# Patient Record
Sex: FEMALE | Race: WHITE | NOT HISPANIC OR LATINO | Employment: FULL TIME
[De-identification: names, ages, dates, MRNs, and addresses within clinical notes are randomized per-mention and may not be internally consistent; named-entity substitution may affect disease eponyms.]

---

## 2017-12-31 ENCOUNTER — HEALTH MAINTENANCE LETTER (OUTPATIENT)
Age: 27
End: 2017-12-31

## 2018-03-05 ENCOUNTER — DOCUMENTATION ONLY (OUTPATIENT)
Dept: FAMILY MEDICINE | Facility: OTHER | Age: 28
End: 2018-03-05

## 2018-03-29 ENCOUNTER — APPOINTMENT (OUTPATIENT)
Dept: OCCUPATIONAL MEDICINE | Facility: OTHER | Age: 28
End: 2018-03-29

## 2018-03-29 ENCOUNTER — APPOINTMENT (OUTPATIENT)
Dept: CHIROPRACTIC MEDICINE | Facility: OTHER | Age: 28
End: 2018-03-29

## 2018-03-29 PROCEDURE — 99499 UNLISTED E&M SERVICE: CPT

## 2019-05-13 ENCOUNTER — OFFICE VISIT (OUTPATIENT)
Dept: FAMILY MEDICINE | Facility: OTHER | Age: 29
End: 2019-05-13
Attending: NURSE PRACTITIONER
Payer: COMMERCIAL

## 2019-05-13 VITALS
RESPIRATION RATE: 12 BRPM | SYSTOLIC BLOOD PRESSURE: 118 MMHG | HEIGHT: 64 IN | BODY MASS INDEX: 23.9 KG/M2 | HEART RATE: 76 BPM | WEIGHT: 140 LBS | DIASTOLIC BLOOD PRESSURE: 68 MMHG | TEMPERATURE: 97.9 F | OXYGEN SATURATION: 98 %

## 2019-05-13 DIAGNOSIS — Z30.011 ENCOUNTER FOR INITIAL PRESCRIPTION OF CONTRACEPTIVE PILLS: Primary | ICD-10-CM

## 2019-05-13 PROCEDURE — 99213 OFFICE O/P EST LOW 20 MIN: CPT | Performed by: NURSE PRACTITIONER

## 2019-05-13 RX ORDER — NORGESTIMATE AND ETHINYL ESTRADIOL 7DAYSX3 28
1 KIT ORAL DAILY
COMMUNITY
End: 2019-05-13

## 2019-05-13 RX ORDER — NORGESTIMATE AND ETHINYL ESTRADIOL 7DAYSX3 28
1 KIT ORAL DAILY
Qty: 30 TABLET | Refills: 11 | Status: SHIPPED | OUTPATIENT
Start: 2019-05-13 | End: 2020-04-14

## 2019-05-13 ASSESSMENT — MIFFLIN-ST. JEOR: SCORE: 1355.04

## 2019-05-13 ASSESSMENT — PAIN SCALES - GENERAL: PAINLEVEL: NO PAIN (0)

## 2019-05-13 NOTE — PATIENT INSTRUCTIONS
ICD-10-CM    1. Encounter for initial prescription of contraceptive pills Z30.011 norgestim-eth estrad triphasic (TRI-VYLIBRA) 0.18/0.215/0.25 MG-35 MCG tablet

## 2019-05-13 NOTE — PROGRESS NOTES
SUBJECTIVE:   Eryn Padilla is a 28 year old female who presents to clinic today for the following   health issues:        Medication Followup of birth control    Taking Medication as prescribed: yes    Side Effects:  None    Medication Helping Symptoms:  not applicable    Patient had been getting from [facility]. Her insurance now indicates she needs to get it from a pharmacy. Last pap 2 years ago    She had her Pap done 2-3 years ago with [facility], is not sure if HPV was done, she is in her 20's - per guidelines it would not have been. She will check with them and schedule pap if needed.         Additional history: as documented    Reviewed  and updated as needed this visit by clinical staff  Tobacco  Allergies  Meds  Med Hx  Surg Hx  Fam Hx  Soc Hx        Reviewed and updated as needed this visit by Provider         Patient Active Problem List   Diagnosis     ACP (advance care planning)     Past Surgical History:   Procedure Laterality Date     HERNIA REPAIR      fx left wrist     ORTHOPEDIC SURGERY      right wrist surgery at age 8-9       Social History     Tobacco Use     Smoking status: Current Every Day Smoker     Packs/day: 1.00     Years: 6.00     Pack years: 6.00     Types: Cigarettes     Smokeless tobacco: Never Used   Substance Use Topics     Alcohol use: Yes     Comment: occ     Family History   Problem Relation Age of Onset     Psychotic Disorder Brother          Current Outpatient Medications   Medication Sig Dispense Refill     norgestim-eth estrad triphasic (TRI-VYLIBRA) 0.18/0.215/0.25 MG-35 MCG tablet Take 1 tablet by mouth daily       No Known Allergies  No lab results found.   BP Readings from Last 3 Encounters:   05/13/19 118/68   06/03/16 116/70   05/10/16 118/80    Wt Readings from Last 3 Encounters:   05/13/19 63.5 kg (140 lb)   06/03/16 56.2 kg (124 lb)   05/10/16 56.8 kg (125 lb 3.2 oz)               ROS:  Constitutional, HEENT, cardiovascular, pulmonary, gi  "and gu systems are negative, except as otherwise noted.    OBJECTIVE:     /68 (BP Location: Left arm, Patient Position: Sitting, Cuff Size: Adult Regular)   Pulse 76   Temp 97.9  F (36.6  C) (Tympanic)   Resp 12   Ht 1.626 m (5' 4\")   Wt 63.5 kg (140 lb)   SpO2 98%   BMI 24.03 kg/m    Body mass index is 24.03 kg/m .  GENERAL: healthy, alert and no distress  MS: no gross musculoskeletal defects noted, no edema  PSYCH: mentation appears normal, affect normal/bright      ASSESSMENT/PLAN:         ICD-10-CM    1. Encounter for initial prescription of contraceptive pills Z30.011 norgestim-eth estrad triphasic (TRI-VYLIBRA) 0.18/0.215/0.25 MG-35 MCG tablet       FUTURE APPOINTMENTS:       - Follow-up visit in 1 year - or for pap if needed per [facility].       Vicki Good NP  [facility] [place] [facility]      "

## 2019-05-13 NOTE — NURSING NOTE
"Chief Complaint   Patient presents with     Contraception       Initial /68 (BP Location: Left arm, Patient Position: Sitting, Cuff Size: Adult Regular)   Pulse 76   Temp 97.9  F (36.6  C) (Tympanic)   Resp 12   Ht 1.626 m (5' 4\")   Wt 63.5 kg (140 lb)   SpO2 98%   BMI 24.03 kg/m   Estimated body mass index is 24.03 kg/m  as calculated from the following:    Height as of this encounter: 1.626 m (5' 4\").    Weight as of this encounter: 63.5 kg (140 lb).  Medication Reconciliation: complete    Delia Gomez LPN    "

## 2020-04-14 DIAGNOSIS — Z30.011 ENCOUNTER FOR INITIAL PRESCRIPTION OF CONTRACEPTIVE PILLS: ICD-10-CM

## 2020-04-14 RX ORDER — NORGESTIMATE AND ETHINYL ESTRADIOL 7DAYSX3 28
KIT ORAL
Qty: 28 TABLET | Refills: 0 | Status: SHIPPED | OUTPATIENT
Start: 2020-04-14 | End: 2020-05-12

## 2020-04-14 NOTE — TELEPHONE ENCOUNTER
TRI-SPRINTEC 0.18/0.215/0.25 MG-35 MCG tablet      Last Written Prescription Date:  5.13.19  Last Fill Quantity: 30,   # refills: 11  Last Office Visit: 5.13.19  Future Office visit:

## 2020-05-12 DIAGNOSIS — Z30.011 ENCOUNTER FOR INITIAL PRESCRIPTION OF CONTRACEPTIVE PILLS: ICD-10-CM

## 2020-05-12 NOTE — LETTER
May 13, 2020      Eryn Padilla  [address]  [place] [address]        Dear Eryn,     Due to current COVID-19 concerns, we are temporarily refilling essential medications without an office visit and/or laboratory testing.  Please understand that certain medications require monitoring, so please schedule an appointment and/or laboratory work when the current restrictions are lifted.  Virtual Visits are available by telephone, video, or MyChart should you have any concerns (uncontrolled symptoms, medication side effects, etc).         Sincerely,    Vicki Good, NP

## 2020-05-12 NOTE — TELEPHONE ENCOUNTER
Tri-sprintec  Last Written Prescription Date: 4/14/20  Last Fill Quantity: 28 # of Refills: 0  Last Office Visit: 5/13/19

## 2020-05-13 RX ORDER — NORGESTIMATE AND ETHINYL ESTRADIOL 7DAYSX3 28
1 KIT ORAL DAILY
Qty: 28 TABLET | Refills: 1 | Status: SHIPPED | OUTPATIENT
Start: 2020-05-13 | End: 2020-07-09

## 2020-05-13 NOTE — TELEPHONE ENCOUNTER
Protocol failed due to    Recent (12 mo) or future (30 days) visit within the authorizing provider's specialty     LOV 5/13/19. Letter sent. Please advise. Thank you!

## 2021-06-22 ENCOUNTER — TELEPHONE (OUTPATIENT)
Dept: FAMILY MEDICINE | Facility: OTHER | Age: 31
End: 2021-06-22

## 2021-11-28 ENCOUNTER — HEALTH MAINTENANCE LETTER (OUTPATIENT)
Age: 31
End: 2021-11-28

## 2022-09-11 ENCOUNTER — HEALTH MAINTENANCE LETTER (OUTPATIENT)
Age: 32
End: 2022-09-11

## 2023-01-03 ENCOUNTER — ALLIED HEALTH/NURSE VISIT (OUTPATIENT)
Dept: PEDIATRICS | Facility: OTHER | Age: 33
End: 2023-01-03

## 2023-01-03 VITALS — HEART RATE: 102 BPM | DIASTOLIC BLOOD PRESSURE: 90 MMHG | SYSTOLIC BLOOD PRESSURE: 132 MMHG

## 2023-01-03 DIAGNOSIS — Z01.30 BLOOD PRESSURE CHECK: Primary | ICD-10-CM

## 2023-01-03 NOTE — PROGRESS NOTES
/90 automatic BP machine  Pulse-102    Dr. Storm present during BP check today and is aware of readings.  Advised patient to call her provider/ OB to report readings.  Patient states she has a BP machine at home and is going to start monitoring it at home and that she has a follow-up appointment on Friday with her provider.

## 2023-01-22 ENCOUNTER — HEALTH MAINTENANCE LETTER (OUTPATIENT)
Age: 33
End: 2023-01-22

## 2023-01-31 ENCOUNTER — MEDICAL CORRESPONDENCE (OUTPATIENT)
Dept: HEALTH INFORMATION MANAGEMENT | Facility: HOSPITAL | Age: 33
End: 2023-01-31

## 2023-02-08 ENCOUNTER — TELEPHONE (OUTPATIENT)
Dept: FAMILY MEDICINE | Facility: OTHER | Age: 33
End: 2023-02-08

## 2023-03-02 ENCOUNTER — MEDICAL CORRESPONDENCE (OUTPATIENT)
Dept: HEALTH INFORMATION MANAGEMENT | Facility: HOSPITAL | Age: 33
End: 2023-03-02

## 2023-05-09 ENCOUNTER — MEDICAL CORRESPONDENCE (OUTPATIENT)
Dept: HEALTH INFORMATION MANAGEMENT | Facility: HOSPITAL | Age: 33
End: 2023-05-09

## 2023-07-12 ENCOUNTER — MEDICAL CORRESPONDENCE (OUTPATIENT)
Dept: HEALTH INFORMATION MANAGEMENT | Facility: HOSPITAL | Age: 33
End: 2023-07-12

## 2023-11-03 ENCOUNTER — TRANSFERRED RECORDS (OUTPATIENT)
Dept: HEALTH INFORMATION MANAGEMENT | Facility: HOSPITAL | Age: 33
End: 2023-11-03

## 2023-11-03 LAB
HEP C HIM: NORMAL
HIV 1&2 EXT: NORMAL

## 2024-02-24 ENCOUNTER — HEALTH MAINTENANCE LETTER (OUTPATIENT)
Age: 34
End: 2024-02-24

## 2024-07-18 ENCOUNTER — MEDICAL CORRESPONDENCE (OUTPATIENT)
Dept: HEALTH INFORMATION MANAGEMENT | Facility: HOSPITAL | Age: 34
End: 2024-07-18

## 2024-08-06 ENCOUNTER — OFFICE VISIT (OUTPATIENT)
Dept: FAMILY MEDICINE | Facility: OTHER | Age: 34
End: 2024-08-06
Attending: STUDENT IN AN ORGANIZED HEALTH CARE EDUCATION/TRAINING PROGRAM
Payer: COMMERCIAL

## 2024-08-06 VITALS
WEIGHT: 163 LBS | HEIGHT: 64 IN | DIASTOLIC BLOOD PRESSURE: 68 MMHG | TEMPERATURE: 97.7 F | SYSTOLIC BLOOD PRESSURE: 108 MMHG | RESPIRATION RATE: 16 BRPM | OXYGEN SATURATION: 98 % | HEART RATE: 76 BPM | BODY MASS INDEX: 27.83 KG/M2

## 2024-08-06 DIAGNOSIS — L98.9 FOOT LESION: ICD-10-CM

## 2024-08-06 DIAGNOSIS — Z76.89 ENCOUNTER TO ESTABLISH CARE: Primary | ICD-10-CM

## 2024-08-06 DIAGNOSIS — L30.9 ECZEMA, UNSPECIFIED TYPE: ICD-10-CM

## 2024-08-06 DIAGNOSIS — L98.9 SKIN LESION: ICD-10-CM

## 2024-08-06 PROBLEM — O03.4 INCOMPLETE ABORTION: Status: RESOLVED | Noted: 2022-01-14 | Resolved: 2024-08-06

## 2024-08-06 PROBLEM — O03.4 INCOMPLETE ABORTION: Status: ACTIVE | Noted: 2022-01-14

## 2024-08-06 PROCEDURE — G2211 COMPLEX E/M VISIT ADD ON: HCPCS | Performed by: STUDENT IN AN ORGANIZED HEALTH CARE EDUCATION/TRAINING PROGRAM

## 2024-08-06 PROCEDURE — 99203 OFFICE O/P NEW LOW 30 MIN: CPT | Performed by: STUDENT IN AN ORGANIZED HEALTH CARE EDUCATION/TRAINING PROGRAM

## 2024-08-06 RX ORDER — HYDROCORTISONE 2.5 %
CREAM (GRAM) TOPICAL 2 TIMES DAILY
Qty: 30 G | Refills: 0 | Status: SHIPPED | OUTPATIENT
Start: 2024-08-06

## 2024-08-06 ASSESSMENT — ANXIETY QUESTIONNAIRES
GAD7 TOTAL SCORE: 0
7. FEELING AFRAID AS IF SOMETHING AWFUL MIGHT HAPPEN: NOT AT ALL
GAD7 TOTAL SCORE: 0
5. BEING SO RESTLESS THAT IT IS HARD TO SIT STILL: NOT AT ALL
6. BECOMING EASILY ANNOYED OR IRRITABLE: NOT AT ALL
8. IF YOU CHECKED OFF ANY PROBLEMS, HOW DIFFICULT HAVE THESE MADE IT FOR YOU TO DO YOUR WORK, TAKE CARE OF THINGS AT HOME, OR GET ALONG WITH OTHER PEOPLE?: NOT DIFFICULT AT ALL
GAD7 TOTAL SCORE: 0
1. FEELING NERVOUS, ANXIOUS, OR ON EDGE: NOT AT ALL
3. WORRYING TOO MUCH ABOUT DIFFERENT THINGS: NOT AT ALL
2. NOT BEING ABLE TO STOP OR CONTROL WORRYING: NOT AT ALL
IF YOU CHECKED OFF ANY PROBLEMS ON THIS QUESTIONNAIRE, HOW DIFFICULT HAVE THESE PROBLEMS MADE IT FOR YOU TO DO YOUR WORK, TAKE CARE OF THINGS AT HOME, OR GET ALONG WITH OTHER PEOPLE: NOT DIFFICULT AT ALL
7. FEELING AFRAID AS IF SOMETHING AWFUL MIGHT HAPPEN: NOT AT ALL
4. TROUBLE RELAXING: NOT AT ALL

## 2024-08-06 ASSESSMENT — PATIENT HEALTH QUESTIONNAIRE - PHQ9
10. IF YOU CHECKED OFF ANY PROBLEMS, HOW DIFFICULT HAVE THESE PROBLEMS MADE IT FOR YOU TO DO YOUR WORK, TAKE CARE OF THINGS AT HOME, OR GET ALONG WITH OTHER PEOPLE: NOT DIFFICULT AT ALL
SUM OF ALL RESPONSES TO PHQ QUESTIONS 1-9: 0
SUM OF ALL RESPONSES TO PHQ QUESTIONS 1-9: 0

## 2024-08-06 ASSESSMENT — PAIN SCALES - GENERAL: PAINLEVEL: NO PAIN (0)

## 2024-08-06 NOTE — PROGRESS NOTES
Assessment & Plan     Encounter to establish care  Medical, surgical, family, and social histories discussed updated. Reviewed active healthcare problems. Medications reviewed.     Eczema, unspecified type  Eczema present in the outer ear and along the pinna.  Recommend trial of hydrocortisone cream, twice daily, up to 14 days but can stop if lesions improved.  Should continue with emollient use twice daily after treatment with a hydrocortisone cream for prevention.  If symptoms are not improving at 2 weeks, recommend reaching out via MyChart to reassess.  May need to return for office visit.  - hydrocortisone 2.5 % cream; Apply topically 2 times daily    Foot lesion  Suspect this was also an eczematous type lesion but it has improved without treatment.  Continue to monitor.    Skin lesion  Suspect inflammatory acne.  Recommend washing with hydrogen peroxide over the lesion and warm compresses to bring to ahead to facilitate drainage.  Lower likelihood but could also be a sebaceous cyst, although much more superficial and atypical appearing.  If unable to facilitate skin drainage and healing with hydrogen peroxide and compresses, recommend follow-up to reassess.        Return in about 1 year (around 8/6/2025) for Physical.    The longitudinal plan of care for the diagnosis(es)/condition(s) as documented were addressed during this visit. Due to the added complexity in care, I will continue to support Eryn in the subsequent management and with ongoing continuity of care.    Subjective   Eryn is a 33 year old, presenting for the following health issues:  Establish Care      8/6/2024     2:26 PM   Additional Questions   Roomed by April   Accompanied by baby         8/6/2024     2:26 PM   Patient Reported Additional Medications   Patient reports taking the following new medications none     History of Present Illness       Reason for visit:  Eat & toes  Symptom onset:  More than a month  Symptoms include:  Dry  "flakes/patches  Symptom intensity:  Moderate  Symptom progression:  Worsening  Had these symptoms before:  Yes  Has tried/received treatment for these symptoms:  No  What makes it worse:  No  What makes it better:  No    She eats 0-1 servings of fruits and vegetables daily.She consumes 1 sweetened beverage(s) daily.She exercises with enough effort to increase her heart rate 9 or less minutes per day.  She exercises with enough effort to increase her heart rate 3 or less days per week.   She is taking medications regularly.     Establish care      Derm issue  Onset/Duration: 4 months  Description  Location: left ear, right foot  Character: flakey  Itching: no  Intensity:  mild  Progression of Symptoms:  same  Accompanying signs and symptoms:   Fever: No  Body aches or joint pain: No  Sore throat symptoms: No  Recent cold symptoms: No  History:           Previous episodes of similar rash: None  New exposures:  None  Recent travel: No  Exposure to similar rash: No  Precipitating or alleviating factors: none  Therapies tried and outcome: none    History of this with her daughter, after pregnancy.   Tried vaseline and it went away.   Ear does not itch.   Tried vaseline and it goes away for a bit but does come back.     Lesion on foot, top, was pruritic but is not now. It has also resolved. Did not try any creams on it.     Also has a spot on her left posterior shoulder.  Started out like a small pimple, then tried to pop.  Thinks it was bleeding/irritated during delivery of few months ago.  Now it is a papule that she has been unable to pop.  Does not like it, wants it gone because she does not want to wear tank tops or have it seen.        Objective    /68 (BP Location: Right arm, Patient Position: Sitting, Cuff Size: Adult Regular)   Pulse 76   Temp 97.7  F (36.5  C) (Tympanic)   Resp 16   Ht 1.626 m (5' 4\")   Wt 73.9 kg (163 lb)   LMP 07/21/2024   SpO2 98%   Breastfeeding No   BMI 27.98 kg/m    Body " mass index is 27.98 kg/m .    Physical Exam  Constitutional:       General: She is not in acute distress.     Appearance: Normal appearance. She is not ill-appearing.   Cardiovascular:      Rate and Rhythm: Normal rate and regular rhythm.      Heart sounds: No murmur heard.  Pulmonary:      Effort: Pulmonary effort is normal.      Breath sounds: Normal breath sounds. No wheezing, rhonchi or rales.   Musculoskeletal:      Right lower leg: No edema.      Left lower leg: No edema.      Comments: Left auricle and outer ear with flaking, drying without honey colored crust.  No lesions inside the external auditory canal or inside the auditory canal.  No pain with manipulation of the pinna.    Right foot with minimal dry skin or crusting today.  No evidence for fungal disease.    5 x 4 mm erythematous nonfluctuant, nontender papule on the left posterior shoulder, see photo.  Quite superficial.  No deep cyst appreciated.   Neurological:      General: No focal deficit present.      Mental Status: She is alert and oriented to person, place, and time.   Psychiatric:         Mood and Affect: Mood normal.         Behavior: Behavior normal.                              Signed Electronically by: Donna Bradford MD

## 2024-08-06 NOTE — PATIENT INSTRUCTIONS
Use steroid cream twice daily for up to 14 days on inside ear.   Apply either Aquaphor/Vaseline OR vanicream/Cerave to ear twice daily long term.   If not improving please let me know.     Thank you for choosing [facility].   I have office hours 8:00 am to 4:30 pm on Mondays, Tuesdays, Thursdays and Fridays. I am out of the office most Wednesdays, although I do occasionally work one Wednesday per month.   Following your visit, if you had labs and diagnostic testing, once they have returned, I will review them and you will be contacted by myself or my nurse if there are concerns. You can also view the labs on [employer].   For refills, notify your pharmacy regarding what you need and the pharmacy will generate a refill request. Please plan ahead and allow 3-5 days for refill requests.  If you have an urgent/same day appointment need, please request an urgent visit when you call and our support staff will send me an Overbook request to try to accommodate you for the urgent visit. I like to fit in and see my own patients and hopefully save you time too!   You can also now schedule appointments on the [employer] indu.   In the event that you need to be seen for urgent concerns and I am out of office, please see one of my colleagues for acute concerns or you may also present to Urgent Care or the ER.  I appreciate the opportunity to serve you and look forward to supporting your healthcare needs in the future.

## 2024-08-19 ENCOUNTER — MEDICAL CORRESPONDENCE (OUTPATIENT)
Dept: HEALTH INFORMATION MANAGEMENT | Facility: HOSPITAL | Age: 34
End: 2024-08-19

## 2024-09-06 ENCOUNTER — OFFICE VISIT (OUTPATIENT)
Dept: FAMILY MEDICINE | Facility: OTHER | Age: 34
End: 2024-09-06
Attending: STUDENT IN AN ORGANIZED HEALTH CARE EDUCATION/TRAINING PROGRAM
Payer: COMMERCIAL

## 2024-09-06 VITALS
OXYGEN SATURATION: 98 % | SYSTOLIC BLOOD PRESSURE: 108 MMHG | BODY MASS INDEX: 28 KG/M2 | DIASTOLIC BLOOD PRESSURE: 74 MMHG | RESPIRATION RATE: 16 BRPM | WEIGHT: 164 LBS | HEART RATE: 72 BPM | HEIGHT: 64 IN | TEMPERATURE: 97.3 F

## 2024-09-06 DIAGNOSIS — L30.9 ECZEMA, UNSPECIFIED TYPE: Primary | ICD-10-CM

## 2024-09-06 LAB
KOH PREPARATION: NORMAL
KOH PREPARATION: NORMAL

## 2024-09-06 PROCEDURE — 87220 TISSUE EXAM FOR FUNGI: CPT | Performed by: STUDENT IN AN ORGANIZED HEALTH CARE EDUCATION/TRAINING PROGRAM

## 2024-09-06 PROCEDURE — G2211 COMPLEX E/M VISIT ADD ON: HCPCS | Performed by: STUDENT IN AN ORGANIZED HEALTH CARE EDUCATION/TRAINING PROGRAM

## 2024-09-06 PROCEDURE — 99213 OFFICE O/P EST LOW 20 MIN: CPT | Performed by: STUDENT IN AN ORGANIZED HEALTH CARE EDUCATION/TRAINING PROGRAM

## 2024-09-06 RX ORDER — TRIAMCINOLONE ACETONIDE 1 MG/G
CREAM TOPICAL 2 TIMES DAILY
Qty: 30 G | Refills: 0 | Status: SHIPPED | OUTPATIENT
Start: 2024-09-06 | End: 2024-09-11

## 2024-09-06 ASSESSMENT — PAIN SCALES - GENERAL: PAINLEVEL: NO PAIN (0)

## 2024-09-06 NOTE — PROGRESS NOTES
Assessment & Plan     Eczema, unspecified type  Again continue to suspect eczema rash/skin changes in her ear.  No signs of tympanic membrane abnormality nor signs or symptoms suggestive of otitis externa  KOH completed today, negative for fungal component.  Slight response initially to hydrocortisone but then has not improved further.  Will trial more potent steroid, triamcinolone for only up to 5 days.  If not improving, need to discontinue and I will place referral for consult with dermatology.  She will reach out via [employer] in five days.   - KOH prep (Other than skin, nails, hair); Future  - KOH prep (Other than skin, nails, hair)  - triamcinolone (KENALOG) 0.1 % external cream; Apply topically 2 times daily for 5 days. Apply to external ear      The longitudinal plan of care for the diagnosis(es)/condition(s) as documented were addressed during this visit. Due to the added complexity in care, I will continue to support Eryn in the subsequent management and with ongoing continuity of care.    Subjective   Eryn is a 33 year old, presenting for the following health issues:  Ear Problem        9/6/2024     8:06 AM   Additional Questions   Roomed by April   Accompanied by self         9/6/2024     8:06 AM   Patient Reported Additional Medications   Patient reports taking the following new medications none     History of Present Illness       Reason for visit:  Ear    She eats 0-1 servings of fruits and vegetables daily.She consumes 1 sweetened beverage(s) daily.She exercises with enough effort to increase her heart rate 9 or less minutes per day.  She exercises with enough effort to increase her heart rate 3 or less days per week.   She is taking medications regularly.     Concern - left ear   Onset: 5 months  Description: flaking   Intensity: moderate  Progression of Symptoms:  same  Accompanying Signs & Symptoms: none  Previous history of similar problem: yes  Precipitating factors:        Worsened by:  "unknown  Alleviating factors:        Improved by: unknown   Therapies tried and outcome: Cortizone cream     Tried hydrocortisone 2.5% last month. Thought improving but stopped improving.   Looks better when it is wet. Worse later in the day.   No moisturizer on ear. Tried vaseline with no benefit.   No itching, drainage or pain.   No ear infections. No swimming.   No new products. No new shampoo or lotions.   Happened after last pregnancy too but went away on its own in a few months.         Objective    /74 (BP Location: Left arm, Patient Position: Sitting, Cuff Size: Adult Regular)   Pulse 72   Temp 97.3  F (36.3  C) (Tympanic)   Resp 16   Ht 1.626 m (5' 4\")   Wt 74.4 kg (164 lb)   LMP 09/05/2024   SpO2 98%   BMI 28.15 kg/m    Body mass index is 28.15 kg/m .  Physical Exam  Constitutional:       General: She is not in acute distress.     Appearance: Normal appearance. She is not ill-appearing.   HENT:      Head: Normocephalic and atraumatic.      Right Ear: Tympanic membrane and ear canal normal.      Left Ear: Tympanic membrane and ear canal normal.      Ears:      Comments: Scaling around left pinna and auricle.  See photo.  Neurological:      Mental Status: She is alert.          Photo taken at home by patient.     Results for orders placed or performed in visit on 09/06/24   KOH prep (Other than skin, nails, hair)     Status: None    Specimen: Ear, Left; Skin   Result Value Ref Range    KOH Preparation No fungal elements seen     KOH Preparation Reference Range: No fungal elements seen.              Signed Electronically by: Donna Bradford MD    "

## 2024-10-10 ENCOUNTER — MEDICAL CORRESPONDENCE (OUTPATIENT)
Dept: HEALTH INFORMATION MANAGEMENT | Facility: HOSPITAL | Age: 34
End: 2024-10-10

## 2024-11-10 ENCOUNTER — MYC MEDICAL ADVICE (OUTPATIENT)
Dept: FAMILY MEDICINE | Facility: OTHER | Age: 34
End: 2024-11-10

## 2024-11-11 NOTE — PROGRESS NOTES
Assessment & Plan     Psoriasis  Located on the posterior scalp and pinna of the left ear. Typically more severe in fall and winter. History of similar scaly lesions on elbows and right great toe. Ear previously responsive to topical triamcinolone. Fungal testing previously negative.   - will trial clobetasol foam. Apply small amount topically to scalp, neck, ear 3x per week and leave on till morning.  - follow up if not improving.     Keratin cyst  Located on the posterior left shoulder. Unresponsive to retinol. Non-cellulitic. Patient requesting removal as it is bothersome to her.   - Scheduled follow up office appointment for incision and cyst removal. Will consider sending to pathology based on appearance.     Folliculitis  Located on the right inner thigh/groin. First appeared shortly after shaving and reportedly rubs on her clothing. Patient extracted 2 weeks ago with recurrence, then extracted again 2 days ago. Notes significant improvement in pain. No warmth, expanding abscess, ongoing drainage, or spreading redness. On exam, Wound remains open without signs of infection in surrounding tissue. Recommend healing by secondary intention. Should utilize twice daily chlorhexidine and dressing with gauze and tape. If worsening or recurrence of abscess, needs follow up for I&D in clinic.   - Chlorhexidine wash followed by dressing with gauze and tape BID        The longitudinal plan of care for the diagnosis(es)/condition(s) as documented were addressed during this visit. Due to the added complexity in care, I will continue to support Eryn in the subsequent management and with ongoing continuity of care.    Subjective   Eryn is a 33 year old, presenting for the following health issues:  Mass        11/12/2024     1:46 PM   Additional Questions   Roomed by Tania Cunningham   Accompanied by None         11/12/2024     1:46 PM   Patient Reported Additional Medications   Patient reports taking the following new  medications None     History of Present Illness       Reason for visit:  Ingrown hair & old issues  Symptom onset:  More than a month  Symptoms include:  Pimple puss sore  Symptom intensity:  Severe  Symptom progression:  Staying the same  Had these symptoms before:  No  What makes it worse:  Walking  What makes it better:  Popping   She is taking medications regularly.       Concern - Boil   Onset: more than a month  Description: possible ingrown hair between inner thigh and vagina on the right side   Intensity: severe  Progression of Symptoms:  same  Accompanying Signs & Symptoms: Pain  Previous history of similar problem: none   Precipitating factors:        Worsened by: walking   Alleviating factors:        Improved by: popping it - patient reports that her  last popped it on Sunday night   Therapies tried and outcome: patient reports popping it gives her some relief but it keeps coming back     Eryn is a 32 yo F presenting with a right sided inner thigh skin lesion. She reports that it first appeared about one month ago shortly after shaving, and has been rubbing on her clothing. Her  extracted it 2 weeks ago which provided pain relief, however, it recurred within 1 week and he extracted it again 2 days ago. She does not know the character or amount of fluid that was expressed. She has tried warm compress. Has not tried any cream or ointment. Denies redness, warmth, discharge, fever, nausea, vomiting, chills.     Additionally, she reports chronic dry skin that is worse in the fall and winter, a scalp lesion, left shoulder lesion, and white flaking skin on her left ear.     Dry skin is long standing. Has previously tried various lotions. Scalp lesion recurs annually in fall and persist through winter. She reports dandruff and states that it is pruritic. Has previously used head and shoulders. Now using selsun blue without improvement. Also reports previous similar lesions on extensor surfaces of  "both elbows and right great toe. Previous fungal testing was negative.     Flaking skin on left ear previously did not respond to hydrocortisone. It improved with triamcinolone but recurred after a two week course. Denies skin atrophy following use of topical steroids, ear pain, itching, discharge, or changes in hearing.       Left shoulder lesion has been longstanding. States that it is unchanged from previous. It did not respond to topical retinol. The appearance bothers her as she often wears cut off shirts. She would like it removed because it is bothersome to her.         Review of Systems  Constitutional, HEENT, cardiovascular, pulmonary, gi and gu systems are negative, except as otherwise noted.      Objective    /81 (BP Location: Right arm, Patient Position: Sitting, Cuff Size: Adult Regular)   Pulse 81   Temp 98.3  F (36.8  C) (Tympanic)   Resp 16   Ht 1.626 m (5' 4\")   Wt 75 kg (165 lb 4 oz)   SpO2 98%   BMI 28.37 kg/m    Body mass index is 28.37 kg/m .  Physical Exam  Exam conducted with a chaperone present.   Constitutional:       Appearance: She is not ill-appearing.   HENT:      Head: Normocephalic and atraumatic.      Comments: Scalp lesions: Midline erythema and excoriations at the level of the hairline. Raised scaly lesion on the right posterior scalp measuring 3 cm diameter with surrounding erythema and excoriations.      Mouth/Throat:      Mouth: Mucous membranes are moist.   Eyes:      Extraocular Movements: Extraocular movements intact.      Conjunctiva/sclera: Conjunctivae normal.   Cardiovascular:      Rate and Rhythm: Normal rate and regular rhythm.      Pulses: Normal pulses.      Heart sounds: Normal heart sounds.   Pulmonary:      Effort: Pulmonary effort is normal.      Breath sounds: Normal breath sounds.   Genitourinary:     General: Normal vulva.          Comments: Right inner thigh lesion measuring approximately 5 mm x 3 mm. Raised with central ulceration. Minimally " tender to palpation. No discharge. Surrounding skin is non-cellulitic.   Musculoskeletal:      Comments: Left shoulder: Raised epidermoid lesion with central pallor measuring approximately 3-4 mm located at superolateral scapula. Surrounding skin is non-cellulitic.    Skin:         Neurological:      General: No focal deficit present.      Mental Status: She is alert.   Psychiatric:         Mood and Affect: Mood normal.         Behavior: Behavior normal.                          Scribed by BRADLEY Ayala under supervision of Dr. Donna Bradford MD    Signed Electronically by: Donna Bradford MD

## 2024-11-11 NOTE — TELEPHONE ENCOUNTER
11/11/2024 9:18 AM  Pt reports boil is causing pain and underwear is irritating area. Pus is present. Pt encouraged warm compression, tub soaks. Pt would like to be seen. Pt scheduled. Pt denies fever pt denies any other sx or concerns.   Deanne Benz, RN

## 2024-11-12 ENCOUNTER — TELEPHONE (OUTPATIENT)
Dept: FAMILY MEDICINE | Facility: OTHER | Age: 34
End: 2024-11-12

## 2024-11-12 ENCOUNTER — OFFICE VISIT (OUTPATIENT)
Dept: FAMILY MEDICINE | Facility: OTHER | Age: 34
End: 2024-11-12
Attending: STUDENT IN AN ORGANIZED HEALTH CARE EDUCATION/TRAINING PROGRAM
Payer: COMMERCIAL

## 2024-11-12 VITALS
WEIGHT: 165.25 LBS | OXYGEN SATURATION: 98 % | RESPIRATION RATE: 16 BRPM | HEART RATE: 81 BPM | SYSTOLIC BLOOD PRESSURE: 128 MMHG | HEIGHT: 64 IN | DIASTOLIC BLOOD PRESSURE: 81 MMHG | TEMPERATURE: 98.3 F | BODY MASS INDEX: 28.21 KG/M2

## 2024-11-12 DIAGNOSIS — L72.0 KERATIN CYST: ICD-10-CM

## 2024-11-12 DIAGNOSIS — L40.9 PSORIASIS: Primary | ICD-10-CM

## 2024-11-12 DIAGNOSIS — L73.9 FOLLICULITIS: ICD-10-CM

## 2024-11-12 RX ORDER — CLOBETASOL PROPIONATE 0.5 MG/G
AEROSOL, FOAM TOPICAL
Qty: 100 G | Refills: 0 | Status: SHIPPED | OUTPATIENT
Start: 2024-11-12

## 2024-11-12 ASSESSMENT — PAIN SCALES - GENERAL: PAINLEVEL_OUTOF10: MILD PAIN (3)

## 2024-11-12 NOTE — TELEPHONE ENCOUNTER
Writer called pharmacy to verify if they can compound a medication that was prescribed by Dr. Bradford. Unruly from [facility] stated they can do it. He also stated that it would cost about $65 since it is not covered by insurance.     Writer called and spoke with the patient. Writer informed her about the cost of the medication. Patient is wondering about a generic form of the cream.     PCP requested to see how much Clobetasol foam would cost. Writer had asked Unruly from [facility] about the price. He stated to have PCP prescribe the medication to see what the cost would be. The cost of the Clobetasol would be a $5 copay. Writer called and updated patient with information. Patient agreeable to the Clobetasol foam. Dr. Bradford has been informed and prescription has been sent.

## 2024-12-09 ENCOUNTER — OFFICE VISIT (OUTPATIENT)
Dept: FAMILY MEDICINE | Facility: OTHER | Age: 34
End: 2024-12-09
Attending: STUDENT IN AN ORGANIZED HEALTH CARE EDUCATION/TRAINING PROGRAM
Payer: COMMERCIAL

## 2024-12-09 VITALS
TEMPERATURE: 98.7 F | DIASTOLIC BLOOD PRESSURE: 68 MMHG | RESPIRATION RATE: 16 BRPM | WEIGHT: 165.1 LBS | OXYGEN SATURATION: 98 % | BODY MASS INDEX: 28.19 KG/M2 | HEIGHT: 64 IN | HEART RATE: 81 BPM | SYSTOLIC BLOOD PRESSURE: 114 MMHG

## 2024-12-09 DIAGNOSIS — L72.0 KERATIN CYST: Primary | ICD-10-CM

## 2024-12-09 RX ORDER — LIDOCAINE HYDROCHLORIDE AND EPINEPHRINE 10; 10 MG/ML; UG/ML
5 INJECTION, SOLUTION INFILTRATION; PERINEURAL ONCE
Status: COMPLETED | OUTPATIENT
Start: 2024-12-09 | End: 2024-12-09

## 2024-12-09 RX ADMIN — LIDOCAINE HYDROCHLORIDE AND EPINEPHRINE 5 ML: 10; 10 INJECTION, SOLUTION INFILTRATION; PERINEURAL at 15:04

## 2024-12-09 ASSESSMENT — PAIN SCALES - GENERAL: PAINLEVEL_OUTOF10: NO PAIN (0)

## 2024-12-09 NOTE — PROGRESS NOTES
"  Assessment & Plan     Keratin cyst  Removed today, see procedure below.  Will follow-up in 8 days for suture removal.  Reviewed signs and symptoms that would prompt sooner follow-up for repeat evaluation.  - lidocaine 1% with EPINEPHrine 1:100,000 injection 5 mL  - Surgical Pathology Exam  - EXC BENIGN SKIN LESION SCLP/NCK/HNDS/FEET/GEN <=0.5 CM        Subjective   Eryn is a 34 year old, presenting for the following health issues:  Derm Problem        12/9/2024     1:47 PM   Additional Questions   Roomed by Claudette Woo   Accompanied by self         12/9/2024     1:47 PM   Patient Reported Additional Medications   Patient reports taking the following new medications OTC cold medicine     HPI     Concern - cyst removal   Onset: 7 months  Description: left shoulder  Intensity: mild  Progression of Symptoms:  same  Accompanying Signs & Symptoms: no  Previous history of similar problem: had in same area, went away and came back  Precipitating factors:        Worsened by: no  Alleviating factors:        Improved by: no  Therapies tried and outcome: peroxide, did not help     Prior evaluation/curbside with dermatology who recommended removal of the cyst.          Objective    /68 (BP Location: Right arm, Patient Position: Sitting, Cuff Size: Adult Regular)   Pulse 81   Temp 98.7  F (37.1  C) (Tympanic)   Resp 16   Ht 1.626 m (5' 4\")   Wt 74.9 kg (165 lb 1.6 oz)   SpO2 98%   BMI 28.34 kg/m    Body mass index is 28.34 kg/m .    Physical Exam  Constitutional:       General: She is not in acute distress.     Appearance: Normal appearance. She is not ill-appearing.   Skin:     Comments: 0.5 cm firm, nonfluctuant cystic lesion on the left posterior shoulder   Neurological:      Mental Status: She is alert.        Procedure: Informed consent was obtained.  Reviewed risks of bleeding, infection, pain, scarring, recurrence of cyst.  Timeout was performed.  Area was cleaned with ChloraPrep x 3.  3 mL of lidocaine " with epinephrine were used for anesthetic.  An 11 blade was used to make a 1 cm long incision.  Unfortunately due to superficial cyst, it was nicked in the process with a very small amount clear liquid, No cheeselike drainage.  Cyst wall proved difficult to be removed but was able to be extracted with forceps.  Area was irrigated with sterile water x 3.  No residual cyst wall was appreciated.  Closed with 4-0 Ethilon sutures, 4 sutures placed.  Reviewed monitoring for signs of infection and when to follow-up urgently.  Recommend avoiding showering for 24 hours.  Then remove bandage and let warm soapy water run over the area.  Do not keep covered with a bandage but allow to be open to air.  Due very tiny fragments removed of cyst wall, did not send for pathology.            Signed Electronically by: Donna Bradford MD

## 2024-12-09 NOTE — PATIENT INSTRUCTIONS
Biopsy - How to take care of the site?  Keep the biopsy site dry and covered for 24 hours. You may shower or bathe the day after the biopsy and you can get the site wet. However, keep the force of the water off the biopsy site. Do not soak the area in water.  After 24 hours you may remove the bandage and clean the site by letting warm soapy water run over it.   Do not keep covered with a bandage after the one to two days.   AVOID lake swimming and pools or hot tubs until the sutures are removed if you have stiches.     When should I call the doctor or nurse?  Call your doctor or nurse if:  ?Your stitches break or the cut opens up again.  ?You get a fever.  ?You have redness or swelling around the cut, or pus drains from the cut.    When will my stitches be taken out?   7-10 DAYS    What should I do after my stitches or staples are out?  After your stitches or staples are out, you should protect the scar from the sun. Use sunscreen on the area or wear clothes or a hat that covers the scar.

## 2024-12-17 ENCOUNTER — MYC MEDICAL ADVICE (OUTPATIENT)
Dept: FAMILY MEDICINE | Facility: OTHER | Age: 34
End: 2024-12-17

## 2024-12-17 ENCOUNTER — OFFICE VISIT (OUTPATIENT)
Dept: FAMILY MEDICINE | Facility: OTHER | Age: 34
End: 2024-12-17
Attending: STUDENT IN AN ORGANIZED HEALTH CARE EDUCATION/TRAINING PROGRAM
Payer: COMMERCIAL

## 2024-12-17 VITALS
HEART RATE: 86 BPM | DIASTOLIC BLOOD PRESSURE: 76 MMHG | TEMPERATURE: 98.1 F | WEIGHT: 163.7 LBS | SYSTOLIC BLOOD PRESSURE: 136 MMHG | BODY MASS INDEX: 27.95 KG/M2 | OXYGEN SATURATION: 100 % | HEIGHT: 64 IN

## 2024-12-17 DIAGNOSIS — J20.9 ACUTE BRONCHITIS WITH SYMPTOMS > 10 DAYS: ICD-10-CM

## 2024-12-17 DIAGNOSIS — Z48.02 VISIT FOR SUTURE REMOVAL: Primary | ICD-10-CM

## 2024-12-17 DIAGNOSIS — R05.1 ACUTE COUGH: ICD-10-CM

## 2024-12-17 RX ORDER — AZITHROMYCIN 250 MG/1
TABLET, FILM COATED ORAL
Qty: 6 TABLET | Refills: 0 | Status: SHIPPED | OUTPATIENT
Start: 2024-12-17 | End: 2024-12-22

## 2024-12-17 ASSESSMENT — PAIN SCALES - GENERAL: PAINLEVEL_OUTOF10: MILD PAIN (3)

## 2024-12-17 NOTE — TELEPHONE ENCOUNTER
12/17/2024 9:16 AM  Writer called pt regarding my chart message. Pt reports wet cough and mild HA. Pt reports negative home covid test. Pt reports unsure if she has a fever due to not checking.   Pt denies any other sx or concerns.   Pt is pushing fluids and taking OTC cough/cold medication.   Pt would like to discuss her sx during her upcoming appt.   Deanne Benz RN

## 2024-12-17 NOTE — PROGRESS NOTES
Assessment & Plan     Visit for suture removal  Sutures removed today with no complications.    Well-healed.  No signs of infection.  Reviewed slight erythema can be related to the sutures.  Reviewed monitoring and when to follow-up, such as if redness is worsening, pain or drainage develops.    Acute cough  Acute bronchitis with symptoms > 10 days  Ongoing more than 10 days and possible double worsening.  Does have some shortness of breath, worse at night with the drainage and a productive cough.  Well-appearing, exam benign and vitals all stable.  No pleuritic pain or tachycardia.  Symptoms do seem better during the day but again worsening and not improving over 10 days.   Which shared decision making, opted to proceed with antibiotics.  Did review that this may still be ongoing viral, however with duration and possible double worsening would favor antibiotics. With normal vitals and lung exam, opted against CXR.   Azithromycin ordered.  Discussed further supportive care with Mucinex.  Try to wean vaping.  Concerning signs and symptoms reviewed that would indicate need for urgent re-evaluation. Patient stated understanding and agreement with the plan.  - azithromycin (ZITHROMAX) 250 MG tablet; Take 2 tablets (500 mg) by mouth daily for 1 day, THEN 1 tablet (250 mg) daily for 4 days.          Nicotine/Tobacco Cessation  She reports that she has been smoking other. She has never been exposed to tobacco smoke. She has never used smokeless tobacco.  Nicotine/Tobacco Cessation Plan  Information offered: Patient not interested at this time        Gay Cerna is a 34 year old, presenting for the following health issues:  Suture Removal and Cough        12/17/2024     1:13 PM   Additional Questions   Roomed by Kristel PERES   Accompanied by self     History of Present Illness       Reason for visit:  Stictches out , trouble breathing,sick  Symptom onset:  1-2 weeks ago  Symptoms include:  Cough,flem,labored  breathing,headache,sore throat,tired  Symptom intensity:  Moderate  Symptom progression:  Staying the same  Had these symptoms before:  No  What makes it worse:  No  What makes it better:  Cough drops   She is taking medications regularly.       Suture removal:   Date sutures applied: last Monday          Where (setting) in which they applied:Office visit   Description:  Type: sutures  Location: left shoulder   History:    Cause of laceration: cyst removal   Accompanying Signs & Symptoms: (staff: if yes-describe)  Redness: YES  Warmth: No  Drainage: No  Still bleeding: No  Fevers: YES- but unknown if related as she has been sick   Last tetanus shot: last tetanus booster within 10 years      Acute Illness  Acute illness concerns: cough   Onset/Duration: over a week   Symptoms:  Fever: unsure - taking cold and cough medicine >7 days - unsure if true temp   Chills/Sweats: YES  Headache (location?): YES - not radiating into teeth or a ton of facial pressure; no neck pain   Sinus Pressure: YES - minimal   Conjunctivitis:  No  Ear Pain: YES: both a little bit   Rhinorrhea: YES  Congestion: YES  Sore Throat: YES   Cough: YES-productive of yellow sputum  Wheeze: YES- feels like she is breathing different even when sitting- feels like she has to work harder to breath.   Decreased Appetite: No  Nausea: No  Vomiting: No  Diarrhea: YES- did have it one night about 1-1.5 weeks ago   Dysuria/Freq.: No  Dysuria or Hematuria: No  Fatigue/Achiness: YES  Sick/Strep Exposure: YES- daughter was sick   Therapies tried and outcome: cough drops, cold and flu medicine.   At night when laying down has a harder time breathing, feels like someone is stepping on her throat and restricting her breathing. Chest has some pain along with pain while coughing. Doesn't seem like a normal cold to her.     Spitting up a ton of phlegm in AM.    1.5 weeks of this.   Unsure if double worsening. Getting worse rather than better.   Does vape.   No pleuritic  "pain.   Able to do daily activities without issue - minimal limiting shortness of breath during the day   No post tussive emesis         Objective    /76 (BP Location: Right arm, Patient Position: Sitting, Cuff Size: Adult Regular)   Pulse 86   Temp 98.1  F (36.7  C) (Tympanic)   Ht 1.626 m (5' 4\")   Wt 74.3 kg (163 lb 11.2 oz)   SpO2 100%   BMI 28.10 kg/m    Body mass index is 28.1 kg/m .    Physical Exam  Constitutional:       General: She is not in acute distress.     Appearance: Normal appearance. She is not ill-appearing.   HENT:      Right Ear: Tympanic membrane and external ear normal.      Left Ear: Tympanic membrane and external ear normal.      Nose: Congestion present. No mucosal edema or rhinorrhea.      Right Sinus: No maxillary sinus tenderness or frontal sinus tenderness.      Left Sinus: No maxillary sinus tenderness or frontal sinus tenderness.      Mouth/Throat:      Mouth: Mucous membranes are moist.      Pharynx: Oropharynx is clear. No oropharyngeal exudate or posterior oropharyngeal erythema.      Tonsils: No tonsillar exudate or tonsillar abscesses.   Eyes:      General: No scleral icterus.        Right eye: No discharge.         Left eye: No discharge.      Extraocular Movements: Extraocular movements intact.      Conjunctiva/sclera: Conjunctivae normal.      Pupils: Pupils are equal, round, and reactive to light.   Cardiovascular:      Rate and Rhythm: Normal rate and regular rhythm.      Heart sounds: No murmur heard.  Pulmonary:      Effort: Pulmonary effort is normal.      Breath sounds: Normal breath sounds. No wheezing, rhonchi or rales.   Musculoskeletal:      Cervical back: Normal range of motion and neck supple. No tenderness.   Lymphadenopathy:      Cervical: No cervical adenopathy.   Skin:     General: Skin is warm and dry.      Capillary Refill: Capillary refill takes less than 2 seconds.      Comments: 4 sutures on the left posterior shoulder.  Incision is " well-healed.  There is 2 mm of surrounding erythema.  No drainage, warmth, or tenderness.   Neurological:      General: No focal deficit present.      Mental Status: She is alert and oriented to person, place, and time.   Psychiatric:         Mood and Affect: Mood normal.         Behavior: Behavior normal.                    Signed Electronically by: Donna Bradford MD

## 2024-12-24 ENCOUNTER — HOSPITAL ENCOUNTER (EMERGENCY)
Facility: HOSPITAL | Age: 34
Discharge: HOME | End: 2024-12-24
Attending: STUDENT IN AN ORGANIZED HEALTH CARE EDUCATION/TRAINING PROGRAM
Payer: COMMERCIAL

## 2024-12-24 VITALS
DIASTOLIC BLOOD PRESSURE: 69 MMHG | OXYGEN SATURATION: 94 % | RESPIRATION RATE: 16 BRPM | TEMPERATURE: 100.2 F | HEART RATE: 102 BPM | SYSTOLIC BLOOD PRESSURE: 112 MMHG

## 2024-12-24 DIAGNOSIS — R11.2 NAUSEA VOMITING AND DIARRHEA: ICD-10-CM

## 2024-12-24 DIAGNOSIS — R19.7 NAUSEA VOMITING AND DIARRHEA: ICD-10-CM

## 2024-12-24 LAB
ALBUMIN SERPL BCG-MCNC: 4.6 G/DL (ref 3.5–5.2)
ALBUMIN UR-MCNC: 10 MG/DL
ALP SERPL-CCNC: 56 U/L (ref 40–150)
ALT SERPL W P-5'-P-CCNC: 18 U/L (ref 0–50)
ANION GAP SERPL CALCULATED.3IONS-SCNC: 10 MMOL/L (ref 7–15)
APPEARANCE UR: ABNORMAL
AST SERPL W P-5'-P-CCNC: 23 U/L (ref 0–45)
BILIRUB SERPL-MCNC: 1.1 MG/DL
BILIRUB UR QL STRIP: NEGATIVE
BUN SERPL-MCNC: 8 MG/DL (ref 6–20)
CALCIUM SERPL-MCNC: 9.5 MG/DL (ref 8.8–10.4)
CHLORIDE SERPL-SCNC: 99 MMOL/L (ref 98–107)
COLOR UR AUTO: YELLOW
CREAT SERPL-MCNC: 0.83 MG/DL (ref 0.51–0.95)
EGFRCR SERPLBLD CKD-EPI 2021: >90 ML/MIN/1.73M2
ERYTHROCYTE [DISTWIDTH] IN BLOOD BY AUTOMATED COUNT: 13 % (ref 10–15)
FLUAV RNA SPEC QL NAA+PROBE: NEGATIVE
FLUBV RNA RESP QL NAA+PROBE: NEGATIVE
GLUCOSE SERPL-MCNC: 100 MG/DL (ref 70–99)
GLUCOSE UR STRIP-MCNC: NEGATIVE MG/DL
HCG UR QL: NEGATIVE
HCO3 SERPL-SCNC: 26 MMOL/L (ref 22–29)
HCT VFR BLD AUTO: 42.8 % (ref 35–47)
HGB BLD-MCNC: 14.1 G/DL (ref 11.7–15.7)
HGB UR QL STRIP: NEGATIVE
HOLD SPECIMEN: NORMAL
HOLD SPECIMEN: NORMAL
KETONES UR STRIP-MCNC: NEGATIVE MG/DL
LEUKOCYTE ESTERASE UR QL STRIP: ABNORMAL
LIPASE SERPL-CCNC: 23 U/L (ref 13–60)
MCH RBC QN AUTO: 29 PG (ref 26.5–33)
MCHC RBC AUTO-ENTMCNC: 32.9 G/DL (ref 31.5–36.5)
MCV RBC AUTO: 88 FL (ref 78–100)
MUCOUS THREADS #/AREA URNS LPF: PRESENT /LPF
NITRATE UR QL: NEGATIVE
PH UR STRIP: 5.5 [PH] (ref 4.7–8)
PLATELET # BLD AUTO: 242 10E3/UL (ref 150–450)
POTASSIUM SERPL-SCNC: 4.2 MMOL/L (ref 3.4–5.3)
PROT SERPL-MCNC: 7.8 G/DL (ref 6.4–8.3)
RBC # BLD AUTO: 4.86 10E6/UL (ref 3.8–5.2)
RBC URINE: 0 /HPF
RSV RNA SPEC NAA+PROBE: NEGATIVE
SARS-COV-2 RNA RESP QL NAA+PROBE: NEGATIVE
SODIUM SERPL-SCNC: 135 MMOL/L (ref 135–145)
SP GR UR STRIP: 1.02 (ref 1–1.03)
SQUAMOUS EPITHELIAL: 10 /HPF
UROBILINOGEN UR STRIP-MCNC: NORMAL MG/DL
WBC # BLD AUTO: 9.7 10E3/UL (ref 4–11)
WBC URINE: 1 /HPF

## 2024-12-24 PROCEDURE — 85048 AUTOMATED LEUKOCYTE COUNT: CPT | Performed by: NURSE PRACTITIONER

## 2024-12-24 PROCEDURE — 82310 ASSAY OF CALCIUM: CPT | Performed by: NURSE PRACTITIONER

## 2024-12-24 PROCEDURE — 81003 URINALYSIS AUTO W/O SCOPE: CPT | Performed by: NURSE PRACTITIONER

## 2024-12-24 PROCEDURE — 250N000011 HC RX IP 250 OP 636: Performed by: STUDENT IN AN ORGANIZED HEALTH CARE EDUCATION/TRAINING PROGRAM

## 2024-12-24 PROCEDURE — 258N000003 HC RX IP 258 OP 636: Performed by: STUDENT IN AN ORGANIZED HEALTH CARE EDUCATION/TRAINING PROGRAM

## 2024-12-24 PROCEDURE — 36415 COLL VENOUS BLD VENIPUNCTURE: CPT | Performed by: NURSE PRACTITIONER

## 2024-12-24 PROCEDURE — 85014 HEMATOCRIT: CPT | Performed by: NURSE PRACTITIONER

## 2024-12-24 PROCEDURE — 81025 URINE PREGNANCY TEST: CPT | Performed by: NURSE PRACTITIONER

## 2024-12-24 PROCEDURE — 87637 SARSCOV2&INF A&B&RSV AMP PRB: CPT | Performed by: NURSE PRACTITIONER

## 2024-12-24 PROCEDURE — 83690 ASSAY OF LIPASE: CPT | Performed by: NURSE PRACTITIONER

## 2024-12-24 PROCEDURE — 99213 OFFICE O/P EST LOW 20 MIN: CPT | Performed by: STUDENT IN AN ORGANIZED HEALTH CARE EDUCATION/TRAINING PROGRAM

## 2024-12-24 PROCEDURE — G0463 HOSPITAL OUTPT CLINIC VISIT: HCPCS | Mod: 25

## 2024-12-24 PROCEDURE — 250N000013 HC RX MED GY IP 250 OP 250 PS 637: Performed by: STUDENT IN AN ORGANIZED HEALTH CARE EDUCATION/TRAINING PROGRAM

## 2024-12-24 RX ORDER — ACETAMINOPHEN 325 MG/1
650 TABLET ORAL ONCE
Status: COMPLETED | OUTPATIENT
Start: 2024-12-24 | End: 2024-12-24

## 2024-12-24 RX ORDER — ONDANSETRON 4 MG/1
4 TABLET, ORALLY DISINTEGRATING ORAL ONCE
Status: COMPLETED | OUTPATIENT
Start: 2024-12-24 | End: 2024-12-24

## 2024-12-24 RX ORDER — ONDANSETRON 4 MG/1
4 TABLET, ORALLY DISINTEGRATING ORAL EVERY 8 HOURS PRN
Qty: 20 TABLET | Refills: 0 | Status: SHIPPED | OUTPATIENT
Start: 2024-12-24

## 2024-12-24 RX ORDER — KETOROLAC TROMETHAMINE 15 MG/ML
15 INJECTION, SOLUTION INTRAMUSCULAR; INTRAVENOUS ONCE
Status: COMPLETED | OUTPATIENT
Start: 2024-12-24 | End: 2024-12-24

## 2024-12-24 RX ADMIN — SODIUM CHLORIDE 1000 ML: 9 INJECTION, SOLUTION INTRAVENOUS at 15:38

## 2024-12-24 RX ADMIN — ACETAMINOPHEN 650 MG: 325 TABLET, FILM COATED ORAL at 15:31

## 2024-12-24 RX ADMIN — KETOROLAC TROMETHAMINE 15 MG: 15 INJECTION, SOLUTION INTRAMUSCULAR; INTRAVENOUS at 15:38

## 2024-12-24 RX ADMIN — ONDANSETRON 4 MG: 4 TABLET, ORALLY DISINTEGRATING ORAL at 13:47

## 2024-12-24 ASSESSMENT — ACTIVITIES OF DAILY LIVING (ADL)
ADLS_ACUITY_SCORE: 47
ADLS_ACUITY_SCORE: 47

## 2024-12-24 ASSESSMENT — ENCOUNTER SYMPTOMS
COUGH: 0
PSYCHIATRIC NEGATIVE: 1
MUSCULOSKELETAL NEGATIVE: 1
NAUSEA: 1
VOMITING: 1
HEADACHES: 1
SHORTNESS OF BREATH: 0
DIARRHEA: 1
FEVER: 1
ABDOMINAL PAIN: 1

## 2024-12-24 ASSESSMENT — COLUMBIA-SUICIDE SEVERITY RATING SCALE - C-SSRS
1. IN THE PAST MONTH, HAVE YOU WISHED YOU WERE DEAD OR WISHED YOU COULD GO TO SLEEP AND NOT WAKE UP?: NO
2. HAVE YOU ACTUALLY HAD ANY THOUGHTS OF KILLING YOURSELF IN THE PAST MONTH?: NO
6. HAVE YOU EVER DONE ANYTHING, STARTED TO DO ANYTHING, OR PREPARED TO DO ANYTHING TO END YOUR LIFE?: NO

## 2024-12-24 NOTE — ED PROVIDER NOTES
History     Chief Complaint   Patient presents with    Abdominal Pain    Nausea, Vomiting, & Diarrhea     HPI  Eryn Clark is a 34 year old female who presents to the ED via private auto, in company of , for the chief complaint of nausea, vomiting, diarrhea, fever, headache that started earlier this morning.  She states that she has had 15-20 episodes of vomiting and diarrhea, also some abdominal cramping.  She denies sick contacts.  She has not taken any medication as of yet for this issue.  She denies any past medical history.    Allergies:  No Known Allergies    Problem List:    Patient Active Problem List    Diagnosis Date Noted    Psoriasis 2024     Priority: Medium        Past Medical History:    Past Medical History:   Diagnosis Date    Closed fracture of lumbar vertebra (H) 2017    Gestational hypertension without significant proteinuria, affecting puerperium 2023    Incomplete  2022    Traumatic closed nondisplaced fracture of distal end of radius, right, with routine healing, subsequent encounter 2016       Past Surgical History:    Past Surgical History:   Procedure Laterality Date    ABDOMEN SURGERY  25    C section    ORTHOPEDIC SURGERY      right wrist surgery at age 8-9       Family History:    Family History   Problem Relation Age of Onset    Schizophrenia Half-Brother        Social History:  Marital Status:   [2]  Social History     Tobacco Use    Smoking status: Every Day     Types: Other     Passive exposure: Never    Smokeless tobacco: Never   Vaping Use    Vaping status: Every Day    Substances: Nicotine, Flavoring    Devices: Pre-filled or refillable cartridge, Refillable tank   Substance Use Topics    Alcohol use: Yes     Comment: occ    Drug use: No        Medications:    ondansetron (ZOFRAN ODT) 4 MG ODT tab  clobetasol propionate (OLUX) 0.05 % external foam  levonorgestrel (MIRENA) 52 MG (20 mcg/day) IUD          Review of Systems    Constitutional:  Positive for fever.   HENT: Negative.     Respiratory:  Negative for cough and shortness of breath.    Cardiovascular:  Negative for chest pain.   Gastrointestinal:  Positive for abdominal pain, diarrhea, nausea and vomiting.   Genitourinary: Negative.    Musculoskeletal: Negative.    Skin:  Negative for rash.   Allergic/Immunologic: Negative for immunocompromised state.   Neurological:  Positive for headaches.   Psychiatric/Behavioral: Negative.     All other systems reviewed and are negative.      Physical Exam   BP: 112/69  Pulse: 102  Temp: 100.2  F (37.9  C)  Resp: 16  SpO2: 94 %      Physical Exam  Vitals and nursing note reviewed.   Constitutional:       General: She is not in acute distress.     Appearance: Normal appearance. She is not diaphoretic.   HENT:      Head: Atraumatic.      Mouth/Throat:      Mouth: Mucous membranes are moist.   Eyes:      General: No scleral icterus.     Conjunctiva/sclera: Conjunctivae normal.   Cardiovascular:      Rate and Rhythm: Normal rate and regular rhythm.      Heart sounds: Normal heart sounds.   Pulmonary:      Effort: No respiratory distress.      Breath sounds: Normal breath sounds.   Abdominal:      General: Abdomen is flat.      Tenderness: There is no abdominal tenderness.   Musculoskeletal:      Cervical back: Neck supple.   Skin:     General: Skin is warm.      Capillary Refill: Capillary refill takes less than 2 seconds.      Findings: No rash.   Neurological:      General: No focal deficit present.      Mental Status: She is alert.   Psychiatric:         Mood and Affect: Mood normal.         ED Course     ED Course as of 12/24/24 1627   Tue Dec 24, 2024   1351 Labs ordered while patient in lobby.            Results for orders placed or performed during the hospital encounter of 12/24/24 (from the past 24 hours)   CBC with platelets   Result Value Ref Range    WBC Count 9.7 4.0 - 11.0 10e3/uL    RBC Count 4.86 3.80 - 5.20 10e6/uL     Hemoglobin 14.1 11.7 - 15.7 g/dL    Hematocrit 42.8 35.0 - 47.0 %    MCV 88 78 - 100 fL    MCH 29.0 26.5 - 33.0 pg    MCHC 32.9 31.5 - 36.5 g/dL    RDW 13.0 10.0 - 15.0 %    Platelet Count 242 150 - 450 10e3/uL   Comprehensive metabolic panel   Result Value Ref Range    Sodium 135 135 - 145 mmol/L    Potassium 4.2 3.4 - 5.3 mmol/L    Carbon Dioxide (CO2) 26 22 - 29 mmol/L    Anion Gap 10 7 - 15 mmol/L    Urea Nitrogen 8.0 6.0 - 20.0 mg/dL    Creatinine 0.83 0.51 - 0.95 mg/dL    GFR Estimate >90 >60 mL/min/1.73m2    Calcium 9.5 8.8 - 10.4 mg/dL    Chloride 99 98 - 107 mmol/L    Glucose 100 (H) 70 - 99 mg/dL    Alkaline Phosphatase 56 40 - 150 U/L    AST 23 0 - 45 U/L    ALT 18 0 - 50 U/L    Protein Total 7.8 6.4 - 8.3 g/dL    Albumin 4.6 3.5 - 5.2 g/dL    Bilirubin Total 1.1 <=1.2 mg/dL   Lipase   Result Value Ref Range    Lipase 23 13 - 60 U/L   Extra Tube    Narrative    The following orders were created for panel order Extra Tube.  Procedure                               Abnormality         Status                     ---------                               -----------         ------                     Extra Blue Top Tube[[id number]]                              Final result               Extra Red Top Tube[[id number]]                               Final result                 Please view results for these tests on the individual orders.   Extra Blue Top Tube   Result Value Ref Range    Hold Specimen JIC    Extra Red Top Tube   Result Value Ref Range    Hold Specimen JIC    Influenza A/B, RSV and SARS-CoV2 PCR (COVID-19) Nasopharyngeal    Specimen: Nasopharyngeal; Swab   Result Value Ref Range    Influenza A PCR Negative Negative    Influenza B PCR Negative Negative    RSV PCR Negative Negative    SARS CoV2 PCR Negative Negative    Narrative    Testing was performed using the Xpert Xpress CoV2/Flu/RSV Assay on the Cepheid GeneXpert Instrument. This test should be ordered for the detection of SARS-CoV2, influenza,  and RSV viruses in individuals with signs and symptoms of respiratory tract infection. This test is for in vitro diagnostic use under the [place] FDA for laboratories certified under CLIA to perform high or moderate complexity testing. This test has been [place] FDA cleared. A negative result does not rule out the presence of PCR inhibitors in the specimen or target RNA in concentration below the limit of detection for the assay. If only one viral target is positive but coinfection with multiple targets is suspected, the sample should be re-tested with another FDA cleared, approved, or authorized test, if coninfection would change clinical management. This test was validated by the [facility] Access Information Management. These laboratories are certified under the Clinical Laboratory Improvement Amendments of 1988 (CLIA-88) as qualified to perfom high complexity laboratory testing.   UA with Microscopic reflex to Culture    Specimen: Urine, Midstream   Result Value Ref Range    Color Urine Yellow Colorless, Straw, Light Yellow, Yellow    Appearance Urine Slightly Cloudy (A) Clear    Glucose Urine Negative Negative mg/dL    Bilirubin Urine Negative Negative    Ketones Urine Negative Negative mg/dL    Specific Gravity Urine 1.024 1.003 - 1.035    Blood Urine Negative Negative    pH Urine 5.5 4.7 - 8.0    Protein Albumin Urine 10 (A) Negative mg/dL    Urobilinogen Urine Normal Normal, 2.0 mg/dL    Nitrite Urine Negative Negative    Leukocyte Esterase Urine Small (A) Negative    Mucus Urine Present (A) None Seen /LPF    RBC Urine 0 <=2 /HPF    WBC Urine 1 <=5 /HPF    Squamous Epithelials Urine 10 (H) <=1 /HPF    Narrative    Urine Culture not indicated   HCG qualitative urine (UPT)   Result Value Ref Range    hCG Urine Qualitative Negative Negative       Medications   sodium chloride 0.9% BOLUS 1,000 mL (1,000 mLs Intravenous $New Bag 12/24/24 1532)   ondansetron (ZOFRAN ODT) ODT tab 4 mg (4 mg Oral $Given 12/24/24 1347)   acetaminophen  (TYLENOL) tablet 650 mg (650 mg Oral $Given 12/24/24 1531)   ketorolac (TORADOL) injection 15 mg (15 mg Intravenous $Given 12/24/24 4583)       Assessments & Plan (with Medical Decision Making)     I have reviewed the nursing notes.    I have reviewed the findings, diagnosis, plan and need for follow up with the patient.          Medical Decision Making  The patient's presentation was of low complexity (an acute and uncomplicated illness or injury).    The patient's evaluation involved:  ordering and/or review of 3+ test(s) in this encounter (see separate area of note for details)    The patient's management necessitated moderate risk (prescription drug management including medications given in the ED).    Fortunately, lab work within normal limits.  Zofran prescription sent to pharmacy.  Patient feeling better here after Zofran, normal saline, Toradol and Tylenol.  Counseled her to keep watch for fevers and administer Tylenol, Motrin as necessary.  Important to stay hydrated, keep up on Zofran for fluid intake.  This is a presumed viral gastroenteritis.  Stable for discharge.    New Prescriptions    ONDANSETRON (ZOFRAN ODT) 4 MG ODT TAB    Take 1 tablet (4 mg) by mouth every 8 hours as needed for nausea.       Final diagnoses:   Nausea vomiting and diarrhea       12/24/2024   [facility]       Sreekanth Young DO  12/24/24 [id number]

## 2024-12-24 NOTE — ED TRIAGE NOTES
"Pt in for evaluation of N,V,D. Reports some diarrhea started yesterday then today developed the nausea and vomiting in addition to the diarrhea. Tried to take tylenol and tums but threw them up. Recently finished abx for a \"cold\".  Denies blood in emesis or stool.         "

## 2025-01-09 ENCOUNTER — MEDICAL CORRESPONDENCE (OUTPATIENT)
Dept: HEALTH INFORMATION MANAGEMENT | Facility: CLINIC | Age: 35
End: 2025-01-09

## 2025-01-28 DIAGNOSIS — L40.9 PSORIASIS: ICD-10-CM

## 2025-01-28 NOTE — TELEPHONE ENCOUNTER
Clobetasol Propionate  (Olux) 0.05% external foam   Last Written Prescription Date:  11/12/2024  Last Fill Quantity: 50 g,   # refills: 0  Last Office Visit: 12/17/2024  Future Office visit:       Routing refill request to provider for review/approval because:

## 2025-01-30 RX ORDER — CLOBETASOL PROPIONATE 0.5 MG/G
AEROSOL, FOAM TOPICAL
Qty: 50 G | Refills: 0 | Status: SHIPPED | OUTPATIENT
Start: 2025-01-30

## 2025-03-04 ENCOUNTER — MYC MEDICAL ADVICE (OUTPATIENT)
Dept: FAMILY MEDICINE | Facility: OTHER | Age: 35
End: 2025-03-04

## 2025-03-04 DIAGNOSIS — L40.9 PSORIASIS: ICD-10-CM

## 2025-03-04 RX ORDER — CLOBETASOL PROPIONATE 0.5 MG/G
AEROSOL, FOAM TOPICAL
Qty: 50 G | Refills: 2 | Status: SHIPPED | OUTPATIENT
Start: 2025-03-04

## 2025-04-03 ENCOUNTER — MYC MEDICAL ADVICE (OUTPATIENT)
Dept: FAMILY MEDICINE | Facility: OTHER | Age: 35
End: 2025-04-03

## 2025-04-22 ENCOUNTER — MYC MEDICAL ADVICE (OUTPATIENT)
Dept: FAMILY MEDICINE | Facility: OTHER | Age: 35
End: 2025-04-22

## 2025-05-13 ENCOUNTER — MYC MEDICAL ADVICE (OUTPATIENT)
Dept: FAMILY MEDICINE | Facility: OTHER | Age: 35
End: 2025-05-13

## 2025-05-13 NOTE — LETTER
5/13/2025      Eryn Clark  [address]  [facility] [address]    TO WHOM IT MAY CONCERN:    Letter of Medical Necessity    I am writing on behalf of my patient, Eryn Clark. Patient is currently using Nizoral shampoo to treat her scalp. This is due to her diagnosis of psoriasis.         Sincerely,      5/13/2025  12:39 PM      Donna Bradford MD

## 2025-08-07 ENCOUNTER — RESULTS FOLLOW-UP (OUTPATIENT)
Dept: FAMILY MEDICINE | Facility: OTHER | Age: 35
End: 2025-08-07

## 2025-08-07 ENCOUNTER — ANCILLARY PROCEDURE (OUTPATIENT)
Dept: GENERAL RADIOLOGY | Facility: OTHER | Age: 35
End: 2025-08-07
Attending: STUDENT IN AN ORGANIZED HEALTH CARE EDUCATION/TRAINING PROGRAM
Payer: COMMERCIAL

## 2025-08-07 DIAGNOSIS — D70.9 NEUTROPENIA, UNSPECIFIED TYPE: Primary | ICD-10-CM

## 2025-08-07 DIAGNOSIS — M25.852 FEMOROACETABULAR IMPINGEMENT OF LEFT HIP: ICD-10-CM

## 2025-08-07 DIAGNOSIS — R29.91 ABNORMAL LEG FINDING: ICD-10-CM

## 2025-08-07 DIAGNOSIS — M25.552 HIP PAIN, LEFT: ICD-10-CM

## 2025-08-07 PROBLEM — M54.42 CHRONIC LEFT-SIDED LOW BACK PAIN WITH LEFT-SIDED SCIATICA: Status: ACTIVE | Noted: 2025-08-07

## 2025-08-07 PROBLEM — G89.29 CHRONIC LEFT-SIDED LOW BACK PAIN WITH LEFT-SIDED SCIATICA: Status: ACTIVE | Noted: 2025-08-07

## 2025-08-07 PROCEDURE — 73552 X-RAY EXAM OF FEMUR 2/>: CPT | Mod: LT | Performed by: RADIOLOGY

## 2025-08-14 ENCOUNTER — LAB (OUTPATIENT)
Dept: LAB | Facility: OTHER | Age: 35
End: 2025-08-14
Payer: COMMERCIAL

## 2025-08-14 DIAGNOSIS — R29.91 ABNORMAL LEG FINDING: ICD-10-CM

## 2025-08-14 DIAGNOSIS — Z00.00 ROUTINE GENERAL MEDICAL EXAMINATION AT A HEALTH CARE FACILITY: ICD-10-CM

## 2025-08-14 LAB
ALBUMIN SERPL BCG-MCNC: 4.4 G/DL (ref 3.5–5.2)
ALP SERPL-CCNC: 45 U/L (ref 40–150)
ALT SERPL W P-5'-P-CCNC: 17 U/L (ref 0–50)
ANION GAP SERPL CALCULATED.3IONS-SCNC: 10 MMOL/L (ref 7–15)
AST SERPL W P-5'-P-CCNC: 20 U/L (ref 0–45)
BASOPHILS # BLD AUTO: <0.03 10E3/UL (ref 0–0.2)
BASOPHILS NFR BLD AUTO: 0.6 %
BILIRUB SERPL-MCNC: 1 MG/DL
BUN SERPL-MCNC: 12 MG/DL (ref 6–20)
CALCIUM SERPL-MCNC: 9.2 MG/DL (ref 8.8–10.4)
CHLORIDE SERPL-SCNC: 105 MMOL/L (ref 98–107)
CHOLEST SERPL-MCNC: 156 MG/DL
CK SERPL-CCNC: 135 U/L (ref 26–192)
CREAT SERPL-MCNC: 0.92 MG/DL (ref 0.51–0.95)
CRP SERPL-MCNC: <3 MG/L
EGFRCR SERPLBLD CKD-EPI 2021: 84 ML/MIN/1.73M2
EOSINOPHIL # BLD AUTO: 0.09 10E3/UL (ref 0–0.7)
EOSINOPHIL NFR BLD AUTO: 2.6 %
ERYTHROCYTE [DISTWIDTH] IN BLOOD BY AUTOMATED COUNT: 12.5 % (ref 10–15)
FASTING STATUS PATIENT QL REPORTED: YES
FASTING STATUS PATIENT QL REPORTED: YES
GLUCOSE SERPL-MCNC: 97 MG/DL (ref 70–99)
HCO3 SERPL-SCNC: 23 MMOL/L (ref 22–29)
HCT VFR BLD AUTO: 39.2 % (ref 35–47)
HDLC SERPL-MCNC: 56 MG/DL
HGB BLD-MCNC: 13.4 G/DL (ref 11.7–15.7)
IMM GRANULOCYTES # BLD: <0.03 10E3/UL
IMM GRANULOCYTES NFR BLD: 0.3 %
LDLC SERPL CALC-MCNC: 84 MG/DL
LYMPHOCYTES # BLD AUTO: 1.43 10E3/UL (ref 0.8–5.3)
LYMPHOCYTES NFR BLD AUTO: 41.3 %
MCH RBC QN AUTO: 30.7 PG (ref 26.5–33)
MCHC RBC AUTO-ENTMCNC: 34.2 G/DL (ref 31.5–36.5)
MCV RBC AUTO: 89.7 FL (ref 78–100)
MONOCYTES # BLD AUTO: 0.33 10E3/UL (ref 0–1.3)
MONOCYTES NFR BLD AUTO: 9.5 %
NEUTROPHILS # BLD AUTO: 1.58 10E3/UL (ref 1.6–8.3)
NEUTROPHILS NFR BLD AUTO: 45.7 %
NONHDLC SERPL-MCNC: 100 MG/DL
NRBC # BLD AUTO: <0.03 10E3/UL
NRBC BLD AUTO-RTO: 0 /100
PLATELET # BLD AUTO: 212 10E3/UL (ref 150–450)
POTASSIUM SERPL-SCNC: 4.2 MMOL/L (ref 3.4–5.3)
PROT SERPL-MCNC: 6.9 G/DL (ref 6.4–8.3)
RBC # BLD AUTO: 4.37 10E6/UL (ref 3.8–5.2)
SODIUM SERPL-SCNC: 138 MMOL/L (ref 135–145)
TRIGL SERPL-MCNC: 82 MG/DL
TSH SERPL DL<=0.005 MIU/L-ACNC: 2 UIU/ML (ref 0.3–4.2)
WBC # BLD AUTO: 3.46 10E3/UL (ref 4–11)

## 2025-08-14 PROCEDURE — 36415 COLL VENOUS BLD VENIPUNCTURE: CPT

## 2025-08-14 PROCEDURE — 82550 ASSAY OF CK (CPK): CPT

## 2025-08-14 PROCEDURE — 80050 GENERAL HEALTH PANEL: CPT

## 2025-08-14 PROCEDURE — 80061 LIPID PANEL: CPT

## 2025-08-14 PROCEDURE — 86140 C-REACTIVE PROTEIN: CPT

## 2025-08-14 PROCEDURE — 3048F LDL-C <100 MG/DL: CPT

## 2025-08-19 ENCOUNTER — MYC MEDICAL ADVICE (OUTPATIENT)
Dept: FAMILY MEDICINE | Facility: OTHER | Age: 35
End: 2025-08-19

## 2025-08-19 DIAGNOSIS — L40.9 PSORIASIS: Primary | ICD-10-CM

## 2025-08-19 RX ORDER — TRIAMCINOLONE ACETONIDE 1 MG/G
CREAM TOPICAL 2 TIMES DAILY
Qty: 45 G | Refills: 0 | Status: SHIPPED | OUTPATIENT
Start: 2025-08-19

## 2025-08-25 ENCOUNTER — TELEPHONE (OUTPATIENT)
Dept: INTERVENTIONAL RADIOLOGY/VASCULAR | Facility: HOSPITAL | Age: 35
End: 2025-08-25

## 2025-08-28 ENCOUNTER — LAB (OUTPATIENT)
Dept: LAB | Facility: OTHER | Age: 35
End: 2025-08-28
Attending: STUDENT IN AN ORGANIZED HEALTH CARE EDUCATION/TRAINING PROGRAM
Payer: COMMERCIAL

## 2025-08-28 ENCOUNTER — HOSPITAL ENCOUNTER (OUTPATIENT)
Dept: MRI IMAGING | Facility: HOSPITAL | Age: 35
End: 2025-08-28
Attending: STUDENT IN AN ORGANIZED HEALTH CARE EDUCATION/TRAINING PROGRAM
Payer: COMMERCIAL

## 2025-08-28 ENCOUNTER — RESULTS FOLLOW-UP (OUTPATIENT)
Dept: FAMILY MEDICINE | Facility: OTHER | Age: 35
End: 2025-08-28

## 2025-08-28 DIAGNOSIS — M25.552 HIP PAIN, LEFT: ICD-10-CM

## 2025-08-28 DIAGNOSIS — R29.91 ABNORMAL LEG FINDING: ICD-10-CM

## 2025-08-28 DIAGNOSIS — M25.852 FEMOROACETABULAR IMPINGEMENT OF LEFT HIP: ICD-10-CM

## 2025-08-28 DIAGNOSIS — D70.9 NEUTROPENIA, UNSPECIFIED TYPE: Primary | ICD-10-CM

## 2025-08-28 PROCEDURE — 73718 MRI LOWER EXTREMITY W/O DYE: CPT | Mod: LT

## 2025-08-28 PROCEDURE — 73718 MRI LOWER EXTREMITY W/O DYE: CPT | Mod: 26 | Performed by: RADIOLOGY
